# Patient Record
(demographics unavailable — no encounter records)

---

## 2020-01-01 NOTE — NEWBORN INFANT H&P-ADMISSION
Infant Record


Exam Date & Time


Date seen by provider:  Oct 4, 2020


Time seen by provider:  10:45





Provider


PCP


no local physician





Delivery Assessment


Expected Date of Delivery:  2021


Hx :  1


Hx Para:  1


Gestational Age in Weeks:  24


Gestational Age in Days:  0


Delivery Date:  Oct 4, 2020


Delivery Time:  1020


Condition of Infant:  Living


Infant Delivery Method:  Spontaneous Vaginal


Anesthesia Type:  None


Prenatal Events:   Labor <37 wks, Routine Prenatal care


Intrapartal Events:  None


Gender:  Female


Viability:  Living





Mother's Group Strep


Mother's Group B Strep:  Not Treated, Unknown





Apgar Score


Apgar Score at 1 Minute:  1


Apgar Score at 5 Minutes:  1


Apgar Score at 10 Minutes:  1





Condition/Feeding


Benefits of breastfeeding discussed with mother.


[place] Feeding Method:  NPO (**If Not Breast Milk Exclusive)


Reason/Not Exclusively Breast


NPO for respiratory distress


Gestation:  Single





Admission Examination


Level of Alertness:  Abnormal


Cry Description:  Feeble


Activity/State:  Drowsy


Suckling:  Did Not Suckle


Skin Comments:  


thin skin, possible bruising to right shoulder


Fontanelles:  Soft, Flat


Anterior [place] Descriptio:  WNL


Cephalohematoma:  No


Sclera Description:  Clear (lids not fused)


Ears:  Normal; No Low Set


Mouth, Nose, Eyes:  Hard & Soft Palate Intact; No Cleft Nares


Neck:  Head Mobile


Cardiovascular:  Regular Rhythm; No Murmur; Brachial Pulses Equal, Femoral 

Pulses Equal


Respiratory:  Irregular


Breath Sounds:  Clear


Caput Succedaneum:  No


Abdomen:  Soft; No Distended; Bowel Sounds Audible


Genitalia:  Appear Normal (for gestational age)


Muscle Tone:  Flaccid


Extremities:  5 digits present on each extremity





Weight/Height


Birth Weight:  660


Weight (Calculated Grams):  500.000





Vital Signs





Vital Signs








  Date Time  Temp Pulse Resp B/P (MAP) Pulse Ox O2 Delivery O2 Flow Rate FiO2


 


10/4/20 10:20 32.0 97   60 NIV CPAP  








Laboratory Tests


10/4/20 11:08: Glucometer 94


10/4/20 11:32: 


White Blood Count 12.6, Red Blood Count 3.20L, Hemoglobin 13.0L, Hematocrit 42, 

Mean Corpuscular Volume 130H, Mean Corpuscular Hemoglobin 41H, Mean Corpuscular 

Hemoglobin Concent 31L, Red Cell Distribution Width 16.7H, Platelet Count 210, 

Mean Platelet Volume 10.5, Immature Granulocyte % (Auto) 3, Neutrophils (%) 

(Auto) 19L, Lymphocytes (%) (Auto) 66H, Monocytes (%) (Auto) 9, Eosinophils (%) 

(Auto) 1, Basophils (%) (Auto) 2, Neutrophils # (Auto) 2.4, Lymphocytes # (Auto)

8.4, Monocytes # (Auto) 1.1H, Eosinophils # (Auto) 0.1, Basophils # (Auto) 0.2H,

Immature Granulocyte # (Auto) 0.4H, Neutrophils % (Manual) 10, Lymphocytes % 

(Manual) 68, Monocytes % (Manual) 12, Myelocytes % 1, Band Neutrophils 5, 

Nucleated Red Blood Cells 51, Atypical Lymphocytes 2, Blast Cells 2, 

Polychromasia MODERATE, Anisocytosis SLIGHT, Macrocytosis MARKED, Sodium Level 

136, Potassium Level 4.5, Chloride Level 104, Carbon Dioxide Level 14L, Anion 

Gap 18H, Blood Urea Nitrogen 7, Creatinine 0.53L, BUN/Creatinine Ratio 13, 

Glucose Level 75, Calcium Level 9.3, Corrected Calcium 10.7H, Total Bilirubin 

1.7L, Aspartate Amino Transf (AST/SGOT) 52H, Alanine Aminotransferase (ALT/SGPT)

7, Alkaline Phosphatase 154, Total Protein 3.3L, Albumin 2.3L





Impression on Admission


Impression on Admission:  Birth, Infant, Living,  (<37 weeks)





Progress/Plan/Problem List


Progress/Plan


See below





(1) 24 WEEK  DELIVERY


Assessment & Plan:    female infant, born via precipitous 

spontaneous vaginal delivery into toilet in hospital visitor bathroom at 24 

weeks gestation. Prenatal records not available. Mom lives in [place], [place], 

where she has reportedly been receiving prenatal care. Mom came to [place], [place], to visit Mom's parents this weekend. Mom came to ED at [facility] this 

morning for abdominal pain, was on her way to the LDR/Women's Services unit from

the ED when she decided to stop and use the bathroom along the way. Father then 

called for help, as mom had expelled the baby unto the toilet while attempting 

to use the bathroom. Infant reportedly had no tone, no respiratory effort, poor 

color, but heart rate was detectable at a rate of 40 as soon as nursing staff 

arrived. Chest compressions were initiated while awaiting equipment to clamp 

cord. Infant was dried, cord clamped and cut, and PPV was initiated with bag and

mask while chest compressions were continued. Infant was transported to the 

trauma bay in the ED, due to proximity, where resuscitation was continued on 

infant warmer. The ED physician, Dr. Aguilar, coordinated the 

resuscitation/code. I was called to assist with resuscitation, and arrived at 

about 10:50 am. At that time, Dr. Aguilar was attempting to intubate the infant.

Nursing staff was performing chest compression and RT was ventilating the infant

using T-piece resuscitator and mask. The infant had already been hooked up to 

cardiorespiratory monitor, and heart rate was in the 90's, oxygen saturation 

probe not attached yet. Infant was limp with occasional respiratory effort. 

Compressions and PPV were briefly paused while Dr. Zelaya attempted 

intubation, and at that time the infant's heart rate was noted to be at just 

over 100, so I suggested that chest compressions be held. Intubation attempt was

unsuccessful, so infant was ventilated using PPV with mask and t-piece 

resuscitator again. Nursing staff auscultated heart rate at just above 100, and 

infant had good symmetric chest rise with PPV. Oxygen saturations were noted to 

be in the 70's on FiO2 of 100%. I called [facility] to request that they send 

over a transport team. I spoke with Dr. Neves, the neonatologist on call at 

[facility], who agreed to accept the patient and agreed to send their  

Transport Team to our facility. After I finished speaking with Dr. Neves, Dr. Aguilar attempted intubation again but was unable to get the ETT to the correct 

position due to the size of the patient's mouth and significant anterior shift 

of airway. However, at that time infant was not requiring chest compressions, 

had heart rate stable at above 100, had improved tone, and oxygen saturation was

in acceptable range with mask PPV. An OG tube was placed and air was aspirated 

from the stomach, with subsequent improvement in oxygen saturations and tone. 

Dr. Aguilar and I agreed to hold off on further intubation attempts at that 

time, and continue providing PPV with mask and t-piece resuscitator, with the 

plan of placing an emergent LMA if the infant's status deteriorated prior to 

arrival of the transport team. Blood sugar was normal. Infant was wrapped in 

saran-wrap to conserve heat and moisture. Infant was noted to still be slightly 

cold, and staff were unable to find a thermal mattress in the ED. All OB/nursery

nursing staff were actively taking care of the infant in the ED. As the infant 

was stable and Dr. Aguilar was still managing the code, I went up to the Ob 

floor to get a thermal mattress and also brought the  crash cart from 

the Ob floor, as this contained the UVC kit. By the time I arrived back at the 

warmer in the ED, nursing staff had located a thermal mattress in the ED crash 

cart and had already activated it and placed the infant on it. The infant's 

temperature then normalized.





Infant was noted to have a slightly foul odor, prompting concerns about possible

infection as trigger for  birth. I placed an emergent low-lying UVC, 

assisted by Dr. Aguilar. At this point, the infant's tone had significantly 

improved, and she was starting to move her arms and legs around spontaneously. 

We shay blood from the UVC to run culture and CBC, then flushed the catheter 

with normal saline, and administered a slow bolus of about 7 mL of normal 

saline. X-ray confirmed low-lying UVC placement, and chest x-ray showed no 

pneumothorax or significant infiltrate. Antibiotics were then prepared from the 

crash-cart, and 4 mg of Ampicillin was administered through the UVC, based on an

estimated weight of 4 kg. Later, infant was weighed and had a weight of 660 

grams, so infant was given an additional 2 mg of Ampicillin as soon as the 4 mg 

dose had finished infusing, to get a total dose of 6 mg of IV ampicillin 

(approximately 100 mg/kg).  I then went to speak with the infant's mother to 

advise her of the patient's status, to summarize the treatment she had received 

so far, and to explain the plan of care, including probable intubation and 

administration of surfactant by the  transport team, followed by 

transfer to the NICU at [facility]. I advised mom that baby was doing incredibly 

well given the circumstances of extreme prematurity and her very rough start. 

However, advised mom that she will probably need to be in the NICU for several 

weeks, and will face many challenges, including risk for infections, potential 

of bleeding in the brain that could result in CP, potential need for prolonged 

respiratory support, the need for feeding via NG, etc. I advised mom that after 

the transport team has stabilized baby, administered the surfactant, and loaded 

her up in the transport incubator, they would most likely bring the baby to 

mom's room in the transport incubator for her to be able to see the baby before 

they leave the hospital. Mom was then moved to the Ob/Postpartum unit.





The  transport team arrived while the Ampicillin was infusing, and 

assumed care, assisted by myself and members of our nursing and RT staff. 

Gentamicin was started by the transport team, first dose administered at a dose 

of 5 mg/kg (to be administered q48h). Infant is not a candidate for cooling due 

to her extreme prematurity. The  nurse practitioner intubated the 

infant, and administered curosurf after ETT placement was confirmed with chest 

x-ray. About 20 minutes after surfactant was administered, the infant started to

have decreased oxygen saturations from the 90's to the 70's. Tone and activity 

decreased a bit, and infant was noted to have a base deficit of 9 on VBG 

performed using transport team's iStat device. Chest x-ray was repeated to rule 

out pneumothorax or ETT displacement, and this was normal. PEEP and PIP were 

increased, and infant was given another NS bolus by the  nurse ac monk, resulting in improved oxygen saturations, tone, and perfusion. Infant was 

then connected to the ventilator and placed in the transport incubator, to be 

taken to Mom's room by NICU transport team staff, prior to leaving the hospital.





Approximately 2 hours spent in critical care





(2)  sepsis


(3) Respiratory distress syndrome in 











ANNIE HUNG MD             Oct 4, 2020 16:20

## 2020-01-01 NOTE — DIAGNOSTIC IMAGING REPORT
EXAMINATION: Portable chest/abdomen at 1211 



INDICATION: Respiratory distress



The cardiothymic silhouette is within normal limits and stable

when compared to the exam performed earlier today 11:41 AM. The

lungs are generally clear. There is a band of increased density

overlying the right lung base. This finding is more likely due to

superimposition than to a pneumothorax or to

pneumonia/atelectasis. The mediastinum is not widened. The

osseous structures are intact.



The prior exam did note that there is an OG line in place with

the tip at the gastroesophageal junction. In the interval since

the prior study the tip has been advanced and now overlies the

gastric body. Also, in the interval since the prior exam the

infant has been intubated. The ET tube tip seem to be good

position overlying the midportion of the tracheal air shadow.



The umbilical catheter line seen previously is again evident and

similar in position. The tip now lies at the level of T10 instead

of T12 as on the prior exam.



The hepatic shadow is prominent but similar to the prior exam.

The bowel gas pattern is nonspecific. The osseous structures are

intact.



IMPRESSION: 

1. The band of increased density along the periphery of the right

lung base is more likely due to superimposition than to

pneumonia/atelectasis or to a pneumothorax. If further study is

desired, then a follow-up chest exam would be recommended.

2. In the interval since the prior exam the OG line has been

advanced and the infant has been intubated. The supportive tubes

and lines seem to be in good position. The UVC line tip now

overlies T10.

3. The hepatic shadow remains prominent. 



Dictated by: 



  Dictated on workstation # [id number]

## 2020-01-01 NOTE — NUR
1022-ER registration called OB at this time requesting that a nurse go to the OP 
Registration Restroom.

1024-This RN arrived to the OP registration restrooms to find the [facility] [place] by [facility] Nursing Instructor holding a   inside the toilet bowl. Approximate 
delivery time per [facility] Nursing Instructor was 1020. This RN took over care of the  at 
this time. HR approximated <100 bpm on auscultation. No respiratory effort noted. Umbilical 
cord remains intact. Chest compressions initiated by this RN. This RN sent for help.

1026-Dr. Zelaya to restroom. Umbilical cord double clamped and cut by Dr. Zelaya. Infant 
carried to bathroom counter and placed on warmed blankets. Chest compressions continue per 
this RN.

1027-Panda warmer to restroom at this time. Infant placed on warmer. Bag/mask ventilation by 
Dr. Zelaya. HR < 100. Chest compressions continue per this RN. 1 minute 1 Min APGAR: 1.

1031-5 Min APGAR: 2.

1032-LAKSHMI Zuniga RN to restroom at this time. This RN and Dr. Zelaya continue CPR. 

[id number] attempts to intubate  by Dr. Zelaya made during this time. Neither 
successful. (1036 10 Min APGAR: 2). Continue CPR with bag/mask.

1041-Movement noted in neonates lower extremities. 15 Min APGAR: 3

1042-Infant transferred to ER via warmer by this RN, Dr. Zelaya, and LAKSHMI Zuniga RN. CPR 
continues.

1043-RT called again. ECG patches applied to neonates chest. HRR < 100.

1046-20 Min APGAR: 4. CPR continues per RT via T-Piece and chest compressions per LAKSHMI Zuniga RN.

1047-Dr. Ontiveros notified and currently in hospital and will make her way to ER.

1051-HR > 100. Chest compression discontinued. Saran wrap placed over infant. [place] 
cap applied to head. RT continues to provide PPV via T-piece. Infant beginning to exhibit 
signs of spontaneous irregular respiratory effort. SPO2 monitor applied to infant's left 
foot. SPO2 running 75-80% on 100% FIO2. Multiple attempts to intubate infant again made by 
Dr. Zelaya without success. PPV reapplied via T-piece. Dr. Ontiveros to ER.

1053-[facility] notified by Dr. Ontiveros.

1055-Thermal mattress placed under infant. 

1100-Respiratory Support continues per RT.

1107-, SPO2 88% with PPV at 100% FIO2 via T-piece.

1109-Heal stick blood glucose obtained: 92 mg/dL/

1110-OG placed, 30 cc air decompressed by LAKSHMI Zuniga RN.

1111-, SPO2 90% with PPV at 100% FIO2 via T-piece. Rectal temp 92.8.

1115-, SPO2 86% with PPV at 100% FIO2 via T-piece. 15 cc air decompressed from 
abdomen. Rectal temp 94.2.

1121-Low lying UVC placed by Dr. Ontiveros. Tone markedly improved at this time. Infant 
kicking legs around. Infant continues to show respiratory effort although irregular.

1123-Blood drawn via UVC by Dr. Zelaya.

1126-7 cc NS bolus administered by Dr. Ontiveros. , SPO2 93% with PPV at 100% FIO2 per 
RT. Rectal temp 91.5.

1133-, SPO2 94% with PPV at 100% FIO2 per RT.

1141-D10 infusing at 2ml/hr. Ampicillan 60mg infusing.

1144- SPO2 98% with PPV at 100% FIO2. [facility] to ER and care of infant assumed.

1151-Weight obtained: 1 lbs 7 oz (660 grams).

## 2020-01-01 NOTE — DIAGNOSTIC IMAGING REPORT
INDICATION:  Respiratory distress.



FINDINGS: Frontal view of the chest demonstrates orogastric tube

in the stomach. Minimal pulmonary infiltrates are present.

Umbilical catheter is at T12. An endotracheal tube is at the T2

level.



IMPRESSION: There are mild pulmonary infiltrates. The umbilical

catheter and orogastric tube are unchanged from earlier today.

The endotracheal tube is at the T2 level.



Dictated by: 



  Dictated on workstation # [id number]

## 2020-01-01 NOTE — ED GENERAL
General


Chief Complaint:  Code Blue


Stated Complaint:  


Nursing Triage Note:  


PT IS APPROX 24 WKS GESTATION SPONTANEOUS DELIVERY. MOM WAS ON WAY UP TO OB AND 


STOPPED IN BATHROOM. INFANT WAS FOUND IN TOILET. CHEST COMPRESSIONS STARTED AND 


OB NURSE PRESENT WHEN THIS RN WENT TO BATHROOM.


Source of Information:  Patient


Exam Limitations:  No Limitations





History of Present Illness


Date Seen by Provider:  Oct 4, 2020


Time Seen by Provider:  10:22


Initial Comments


Called emergently to Hospital registration waiting room bathroom for precipitous

delivery in the toilet. Mother reports that she's approximately 24 weeks 

gestation. OB nurse on scene and currently providing CPR to child between mothe

r's legs who is sitting on the toilet. No fetal movement noted other than 

occasional gasp breaths. Nurse reports heart rate 30-45. Mother apparently here 

visiting parents from [place] and started having abdominal pain. She thought

she was constipated. Currently felt the urge to go to the bathroom and had the 

precipitous delivery.


Severity:  Severe





Allergies and Home Medications


Allergies


Coded Allergies:  


     No Known Drug Allergies (Unverified , 10/4/20)





Patient Home Medication List


Home Medication List Reviewed:  Yes





Review of Systems


Review of Systems


Constitutional:  no symptoms reported


Precipitous delivery of 24 week gestational age premature infant.





Past Medical-Social-Family Hx


Past Med/Social Hx:  Reviewed Nursing Past Med/Soc Hx


Patient Social History


Recent Foreign Travel:  No


Contact w/Someone Who Travel:  No


Recent Infectious Disease Expo:  No


Ebola Symptoms:  Denies Symptoms Listed





Family Medical History





Precipitous delivery at 24 weeks gestation





Physical Exam


Vital Signs





Vital Signs - First Documented








 10/4/20





 10:20


 


Temp 32.0


 


Pulse 97


 


Pulse Ox 60


 


O2 Delivery NIV CPAP





Capillary Refill :


Height, Weight, BMI


Height: '"


Weight: lbs. oz. kg;  BMI


Method:


General Appearance:  Other (initial unresponsive with occasional gasping breath 

and pink to purple in color with CPR in progress.)


Respiratory:  Other (somewhat difficult bagging with crackles noted on 

auscultation)


Cardiovascular:  Bradycardia


Skin:  Other (dusky pink/purple initially and improved later to pink)





Progress/Results/Core Measures


Suspected Sepsis


SIRS


Temperature: 


Pulse:  


Respiratory Rate: 


 


Laboratory Tests


10/4/20 11:32: White Blood Count 12.6


Blood Pressure  / 


Mean: 


 


Laboratory Tests


10/4/20 11:32: 


Creatinine 0.53L, Platelet Count 210, Total Bilirubin 1.7L








Results/Orders


Lab Results





Laboratory Tests








Test


 10/4/20


11:08 10/4/20


11:09 10/4/20


11:32 Range/Units


 


 


Glucometer 94      MG/DL


 


Arterial Blood Partial


Pressure CO2 


 104 H


 


 25-40  MMHG





 


Arterial Blood Partial


Pressure O2 


 18 L


 


 55-95  MMHG





 


Arterial Blood HCO3  15 L  17-24  MMOL/L


 


Arterial Blood Oxygen


Saturation 


 13 L


 


 40-90  %





 


Arterial Blood Base Excess


 


 -18.3 L


 


 -2.5-2.5


MMOL/L


 


Cord Arterial Blood pH  6.78 L  7.35-7.45  


 


Blood Gas Inspired Oxygen  ROOM AIR    


 


White Blood Count


 


 


 12.6 


 6.0-17.5


10^3/uL


 


Red Blood Count


 


 


 3.20 L


 4.00-6.00


10^6/uL


 


Hemoglobin   13.0 L 14.0-23.0  g/dL


 


Hematocrit   42  40-72  %


 


Mean Corpuscular Volume   130 H   fL


 


Mean Corpuscular Hemoglobin   41 H 30-40  pg


 


Mean Corpuscular Hemoglobin


Concent 


 


 31 L


 32-36  g/dL





 


Red Cell Distribution Width   16.7 H 10.0-14.5  %


 


Platelet Count


 


 


 210 


 130-400


10^3/uL


 


Mean Platelet Volume   10.5  9.0-12.2  fL


 


Immature Granulocyte % (Auto)   3   %


 


Neutrophils (%) (Auto)   19 L 42-75  %


 


Lymphocytes (%) (Auto)   66 H 12-44  %


 


Monocytes (%) (Auto)   9  0-12  %


 


Eosinophils (%) (Auto)   1  0-10  %


 


Basophils (%) (Auto)   2  0-10  %


 


Neutrophils # (Auto)


 


 


 2.4 


 1.5-8.5


10^3/uL


 


Lymphocytes # (Auto)


 


 


 8.4 


 4.0-10.5


10^3/uL


 


Monocytes # (Auto)


 


 


 1.1 H


 0.0-1.0


10^3/uL


 


Eosinophils # (Auto)


 


 


 0.1 


 0.0-0.3


10^3/uL


 


Basophils # (Auto)


 


 


 0.2 H


 0.0-0.1


10^3/uL


 


Immature Granulocyte # (Auto)


 


 


 0.4 H


 0.0-0.1


10^3/uL


 


Neutrophils % (Manual)   10   %


 


Lymphocytes % (Manual)   68   %


 


Monocytes % (Manual)   12   %


 


Myelocytes %   1   %


 


Band Neutrophils   5   %


 


Nucleated Red Blood Cells   51   


 


Atypical Lymphocytes   2   %


 


Blast Cells   2   %


 


Polychromasia   MODERATE   


 


Anisocytosis   SLIGHT   


 


Macrocytosis   MARKED   


 


Sodium Level   136  135-145  MMOL/L


 


Potassium Level   4.5  3.6-5.0  MMOL/L


 


Chloride Level   104    MMOL/L


 


Carbon Dioxide Level   14 L 21-32  MMOL/L


 


Anion Gap   18 H 5-14  MMOL/L


 


Blood Urea Nitrogen   7  7-18  MG/DL


 


Creatinine


 


 


 0.53 L


 0.60-1.30


MG/DL


 


BUN/Creatinine Ratio   13   


 


Glucose Level   75    MG/DL


 


Calcium Level   9.3  8.5-10.1  MG/DL


 


Corrected Calcium   10.7 H 8.5-10.1  MG/DL


 


Total Bilirubin   1.7 L 2.0-6.0  MG/DL


 


Aspartate Amino Transf


(AST/SGOT) 


 


 52 H


 5-34  U/L





 


Alanine Aminotransferase


(ALT/SGPT) 


 


 7 


 0-55  U/L





 


Alkaline Phosphatase   154    U/L


 


Total Protein   3.3 L 6.4-8.2  GM/DL


 


Albumin   2.3 L 3.2-4.5  GM/DL








Micro Results





Microbiology


10/4/20 Blood Culture - Preliminary, Resulted


          Strep agalactiae Group B





My Orders





Orders - JOSE LING MD


Blood Culture (10/4/20 11:31)


Cbc With Automated Diff (10/4/20 11:32)


Comprehensive Metabolic Panel (10/4/20 11:32)


Manual Differential (10/4/20 11:32)





Vital Signs/I&O


Capillary Refill :


Progress Note :  


Progress Note


Seen and evaluated initially in the bathroom outside of outpatient registration.

Cord clamped and cut by me as nurse was providing cardiopulmonary resuscitation 

between mother's legs on the toilet. Child moved to countertop on warm blanket 

and resuscitation continued pending arrival of warmer. CPR continued and oxygen 

via bag-valve-mask. Moved to ED by baby warmer at around 10:40 AM. Resuscitation

continued with O2 via CPAP and CPR. Monitor equipment applied and heart rate 98 

- 100 with temp 87.3 at 1049. NG tube placed at that time. CPR stopped at 1051 

with heart rate exceeded 100 and O2 sat in the 60s. O2 sat continued to climb 

and child was noted to have movement.. 1104: Heart rate 105 and O2 sat 87% with 

blood sugar 94 and CPAP bagging in progress. Dr. Ontiveros arrives and assists 

with resuscitation. I have tried to intubate the child twice without success and

did quick look third time with the video scope which was too large. We elected 

to continue CPAP and await arrival of NICU team from [facility] in [place]. 1122: 

Umbilical vein catheter placed by Dr. Ontiveros. 7 mL normal saline IV fluids 

bolus done by me through the catheter after cultures and blood drawn. X-ray 

confirms appropriate placement and ampicillin 40 mg IV initiated per Dr. Ontiveros. Chest x-ray confirms placement but orogastric tube noted to be at 

gastroesophageal junction. This was advanced. 1139 O2 sat 96% and heart rate 

150s. 1144: [facility] N[facility] team here. Infant weight noted to be 660 g. Child is 

moving all 4 extremities and occasionally taking breaths. We have noted 

occasional weak cries. Stool and urine noted from child. O2 saturations have 

improved. O2 sat 98% with heart rate 144 on 100% CPAP assisted ventilation. 121

1: Patient was intubated by NICU team and O2 saturations have continue to 

improve. Additional 20 mg of ampicillin added to the 40 mg given previously. 

Care transferred to [facility] team to be transported to [place].





Diagnostic Imaging





   Diagonstic Imaging:  Xray


   Plain Films/CT/US/NM/MRI:  chest


Comments


                 [facility].


                                [place], [place]





NAME:   ROBERT HUGO


MED REC#:   [id number]


ACCOUNT#:   [id number]


PT STATUS:   REG ER


:   2020


PHYSICIAN:   TIMBO BASS APRN


ADMIT DATE:   10/04/20/ER


                                  ***Signed***


Date of Exam:10/04/20





CHEST 1 VIEW, AP/PA ONLY








INDICATION: Line placement





FINDINGS: Supine view of the abdomen and pelvis demonstrates an


orogastric tube with its tip at the gastroesophageal junction.


Umbilical catheter has its tip at T12. Lungs are clear. Bowel gas


pattern is unremarkable.





IMPRESSION:


1. The orogastric tube tip is at the gastroesophageal junction.


This needs be further advanced.


2. The umbilical catheter tip is at T12.





Dictated by: 





  Dictated on workstation # [id number]








Dict:   10/04/20 1201


Trans:   10/04/20 1214


[facility] [id number]





Interpreted by:     EUN LONDON MD


Electronically signed by: EUN LONDON MD 10/04/20 1214








   Diagonstic Imaging:  Xray


   Plain Films/CT/US/NM/MRI:  chest


Comments


                 [facility].


                                [place], [place]





NAME:   ROBERT HUGO


MED REC#:   [id number]


ACCOUNT#:   [id number]


PT STATUS:   DEP ER


:   2020


PHYSICIAN:   TIMBO BASS APRN


ADMIT DATE:   10/04/20/ER


                                   ***Draft***


Date of Exam:10/04/20





CHEST 1 VIEW, AP/PA ONLY











EXAMINATION: Portable chest/abdomen at 1211 





INDICATION: Respiratory distress





The cardiothymic silhouette is within normal limits and stable


when compared to the exam performed earlier today 11:41 AM. The


lungs are generally clear. There is a band of increased density


overlying the right lung base. This finding is more likely due to


superimposition than to a pneumothorax or to


pneumonia/atelectasis. The mediastinum is not widened. The


osseous structures are intact.





The prior exam did note that there is an OG line in place with


the tip at the gastroesophageal junction. In the interval since


the prior study the tip has been advanced and now overlies the


gastric body. Also, in the interval since the prior exam the


infant has been intubated. The ET tube tip seem to be good


position overlying the midportion of the tracheal air shadow.





The umbilical catheter line seen previously is again evident and


similar in position. The tip now lies at the level of T10 instead


of T12 as on the prior exam.





The hepatic shadow is prominent but similar to the prior exam.


The bowel gas pattern is nonspecific. The osseous structures are


intact.





IMPRESSION: 


1. The band of increased density along the periphery of the right


lung base is more likely due to superimposition than to


pneumonia/atelectasis or to a pneumothorax. If further study is


desired, then a follow-up chest exam would be recommended.


2. In the interval since the prior exam the OG line has been


advanced and the infant has been intubated. The supportive tubes


and lines seem to be in good position. The UAC line tip now


overlies T10.


3. The hepatic shadow remains prominent. 





  Dictated on workstation # [id number]








Dict:   10/04/20 1255


Trans:   10/04/20 1336


[facility] [id number]





Interpreted by:     SCHUYLER SANCHEZ MD


Electronically signed by:








   Robyn Imaging:  Xray


   Plain Films/CT/US/NM/MRI:  chest


Comments


                 [facility] [place], [place]





NAME:   ROBERT HUGO


MED REC#:   [id number]


ACCOUNT#:   [id number]


PT STATUS:   DEP ER


:   2020


PHYSICIAN:   TIMBO BASS APRN


ADMIT DATE:   10/04/20/ER


                                   ***Draft***


Date of Exam:10/04/20





CHEST 1 VIEW, AP/PA ONLY








INDICATION:  Respiratory distress.





FINDINGS: Frontal view of the chest demonstrates orogastric tube


in the stomach. Minimal pulmonary infiltrates are present.


Umbilical catheter is at T12. An endotracheal tube is at the T2


level.





IMPRESSION: There are mild pulmonary infiltrates. The umbilical


catheter and orogastric tube are unchanged from earlier today.


The endotracheal tube is at the T2 level.





  Dictated on workstation # [id number]








Dict:   10/04/20 1256


Trans:   10/04/20 1333


[facility] [id number]





Interpreted by:     EUN LONDON MD


Electronically signed by:





Departure


Impression





   Primary Impression:  


    infant


   Qualified Codes:  P07.23 - Extreme immaturity of , gestational age 24 

   completed weeks


   Additional Impressions:  


   Respiratory distress syndrome in 


    sepsis


Disposition:   XFER SHT-TRM HOSP


Condition:  Stable





Transfer


Transfer Reason:  Exceeds level of care











JOSE LING MD           Oct 4, 2020 12:12

## 2020-01-01 NOTE — DIAGNOSTIC IMAGING REPORT
INDICATION: Line placement



FINDINGS: Supine view of the abdomen and pelvis demonstrates an

orogastric tube with its tip at the gastroesophageal junction.

Umbilical catheter has its tip at T12. Lungs are clear. Bowel gas

pattern is unremarkable.



IMPRESSION:

1. The orogastric tube tip is at the gastroesophageal junction.

This needs be further advanced.

2. The umbilical catheter tip is at T12.



Dictated by: 



  Dictated on workstation # [id number]

## 2020-01-01 NOTE — PROCEDURE/INTERVENTION NOTE
Procedure Note


Preoperative


Date of Service:  Oct 4, 2020


Time of Procedure:  11:20





Vital Signs








  Date Time  Temp Pulse Resp B/P (MAP) Pulse Ox O2 Delivery O2 Flow Rate FiO2


 


10/4/20 10:20 32.0 97   60 NIV CPAP  








Indication


Prematurity, presumed sepsis, urgent need for IV antibiotics and fluid 

resuscitation


Risk/Time Out


Unable to obtain consent from parents due to emergent situation. Time out pe

rformed, verified correct patient, correct procedure, correct site.


Technique


The umbilical cord and adjacent areas of the abdomen were cleaned with betadyne 

x3. A 3-way stopcock device was attached to a 2.5 guage UVC, and this was flu

shed with sterile saline. Using sterile technique, a sterile umbilical tape was 

wrapped around the base of the umbilical cord, and a scalpel was used to cut the

umbilical cord to a length of about 1 cm. Anatomy was confirmed, with umbilical 

vein and 2 umbilical arteries visualized. A sterile drap was then placed over 

the abdomen with the umbilical cord protruding through the central opening of 

the drape. The UVC was inserted into the umbilical vein and advanced to a depth 

of 6 cm. Dr. Aguilar, also using sterile technique, was able to aspirate a small

amount of blood. The UVC was flushed, then a new syringe was attached to the 

3-way stopcock to aspirate 1 mL of blood to send for culture. Another syringe 

was used to collect another 1 mL of blood to run CBC and CMP. Flush syringe was 

then reattached to the 3-way stopcock and the catheter was flushed again. Dr. Aguilar used suture to secure the UVC while it was held in place by Dr. Hung.

A suture was secured to the ezekiel's jelly of the umbilical card, then wrapped 

around the UVC several times, and then secured to the original knot. X-ray was 

then obtained to confirm placement. Hemostasis was confirmed, with no bleeding 

from the cord. Umbilical tape was left in place.





Complications


None











ANNIE HUNG MD             Oct 4, 2020 17:33

## 2020-01-01 NOTE — HISTORY & PHYSICAL
History and Physical


Date Seen by Provider:  Oct 4, 2020


Time Seen by Provider:  10:55





Allergies and Home Medications


Allergies


Coded Allergies:  


     No Known Drug Allergies (Unverified , 10/4/20)





Patient Home Medication List


Home Medication List Reviewed:  Yes











TANNER LUCIO MD        Oct 4, 2020 11:57